# Patient Record
Sex: FEMALE | Race: OTHER | NOT HISPANIC OR LATINO | Employment: FULL TIME | URBAN - METROPOLITAN AREA
[De-identification: names, ages, dates, MRNs, and addresses within clinical notes are randomized per-mention and may not be internally consistent; named-entity substitution may affect disease eponyms.]

---

## 2022-07-06 RX ORDER — NITROFURANTOIN 25; 75 MG/1; MG/1
CAPSULE ORAL
COMMUNITY

## 2022-07-06 RX ORDER — CETIRIZINE HYDROCHLORIDE 10 MG/1
TABLET ORAL
COMMUNITY

## 2022-07-06 RX ORDER — IBUPROFEN 800 MG/1
TABLET ORAL
COMMUNITY

## 2022-07-06 RX ORDER — VALACYCLOVIR HYDROCHLORIDE 1 G/1
TABLET, FILM COATED ORAL
COMMUNITY

## 2022-07-06 RX ORDER — PHENAZOPYRIDINE HYDROCHLORIDE 200 MG/1
TABLET, FILM COATED ORAL
COMMUNITY

## 2022-07-06 RX ORDER — DEXTROAMPHETAMINE SACCHARATE, AMPHETAMINE ASPARTATE MONOHYDRATE, DEXTROAMPHETAMINE SULFATE AND AMPHETAMINE SULFATE 7.5; 7.5; 7.5; 7.5 MG/1; MG/1; MG/1; MG/1
CAPSULE, EXTENDED RELEASE ORAL
COMMUNITY

## 2024-05-23 ENCOUNTER — OFFICE VISIT (OUTPATIENT)
Dept: URGENT CARE | Facility: CLINIC | Age: 34
End: 2024-05-23

## 2024-05-23 VITALS
RESPIRATION RATE: 19 BRPM | BODY MASS INDEX: 22.76 KG/M2 | DIASTOLIC BLOOD PRESSURE: 100 MMHG | TEMPERATURE: 99 F | HEART RATE: 91 BPM | HEIGHT: 67 IN | WEIGHT: 145 LBS | SYSTOLIC BLOOD PRESSURE: 140 MMHG | OXYGEN SATURATION: 98 %

## 2024-05-23 DIAGNOSIS — L08.9 FINGER INFECTION: ICD-10-CM

## 2024-05-23 DIAGNOSIS — S69.92XA INJURY OF LEFT RING FINGER, INITIAL ENCOUNTER: Primary | ICD-10-CM

## 2024-05-23 DIAGNOSIS — R22.32 LOCALIZED SWELLING OF FINGER OF LEFT HAND: ICD-10-CM

## 2024-05-23 DIAGNOSIS — L03.012 CELLULITIS OF FINGER OF LEFT HAND: ICD-10-CM

## 2024-05-23 DIAGNOSIS — R22.32 LOCALIZED SWELLING ON LEFT HAND: ICD-10-CM

## 2024-05-23 PROCEDURE — 99203 OFFICE O/P NEW LOW 30 MIN: CPT | Mod: S$GLB,,, | Performed by: NURSE PRACTITIONER

## 2024-05-23 RX ORDER — MUPIROCIN 20 MG/G
OINTMENT TOPICAL 3 TIMES DAILY PRN
Qty: 22 G | Refills: 0 | Status: SHIPPED | OUTPATIENT
Start: 2024-05-23

## 2024-05-23 RX ORDER — CEPHALEXIN 500 MG/1
500 CAPSULE ORAL EVERY 6 HOURS
Qty: 28 CAPSULE | Refills: 0 | Status: SHIPPED | OUTPATIENT
Start: 2024-05-23 | End: 2024-05-30

## 2024-05-23 RX ORDER — DEXTROAMPHETAMINE SACCHARATE, AMPHETAMINE ASPARTATE, DEXTROAMPHETAMINE SULFATE AND AMPHETAMINE SULFATE 7.5; 7.5; 7.5; 7.5 MG/1; MG/1; MG/1; MG/1
1 TABLET ORAL
COMMUNITY
Start: 2024-05-13

## 2024-05-23 RX ORDER — CEPHALEXIN 500 MG/1
500 CAPSULE ORAL EVERY 6 HOURS
Qty: 28 CAPSULE | Refills: 0 | Status: SHIPPED | OUTPATIENT
Start: 2024-05-23 | End: 2024-05-23

## 2024-05-23 RX ORDER — DULOXETIN HYDROCHLORIDE 60 MG/1
60 CAPSULE, DELAYED RELEASE ORAL EVERY MORNING
COMMUNITY
Start: 2024-05-13

## 2024-05-23 RX ORDER — MUPIROCIN 20 MG/G
OINTMENT TOPICAL 3 TIMES DAILY PRN
Qty: 22 G | Refills: 0 | Status: SHIPPED | OUTPATIENT
Start: 2024-05-23 | End: 2024-05-23

## 2024-05-23 NOTE — PATIENT INSTRUCTIONS
Please drink plenty of fluids.  Please get plenty of rest.  Please return here or go to the Emergency Department for any concerns or worsening of condition.  You were prescribed antibiotics, please take them to completion.  Please return here in 2-3 days for a recheck of your wound. NO IMPROVEMENT  If not allergic, please take over the counter Tylenol (Acetaminophen) and/or Motrin (Ibuprofen) as directed for control of pain and/or fever.  Please follow up with your primary care doctor or specialist as needed.    If you  smoke, please stop smoking.

## 2024-05-23 NOTE — PROGRESS NOTES
"Subjective:      Patient ID: Halle Gray is a 33 y.o. female.    Vitals:  height is 5' 7" (1.702 m) and weight is 65.8 kg (145 lb). Her temperature is 98.7 °F (37.1 °C). Her blood pressure is 140/100 (abnormal) and her pulse is 91. Her respiration is 19 and oxygen saturation is 98%.     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 She reports cutting her finger at work on a bottle of alcohol 2 days ago treated injury with super glue, peroxide and topical antibiotic ointment from a first aid kit. Last night she noticed yellow oozing substance with swelling and redness to her ring finger and swelling and redness to left hand. Last tetanus vaccine reported as 4 years ago.     Hand Pain   The incident occurred 2 days ago. The incident occurred at work. The pain is present in the right hand. Pertinent negatives include no numbness. She has tried ice for the symptoms.       Constitution: Negative for chills and fever.   Musculoskeletal:  Positive for pain, trauma and muscle ache.   Skin:  Positive for color change and erythema.   Neurological:  Negative for numbness and tingling.      Objective:     Physical Exam   Constitutional: She is oriented to person, place, and time. She appears well-developed.   HENT:   Head: Normocephalic and atraumatic. Head is without abrasion, without contusion and without laceration.   Ears:   Right Ear: External ear normal.   Left Ear: External ear normal.   Nose: " Nose normal.   Mouth/Throat: Oropharynx is clear and moist and mucous membranes are normal.   Eyes: EOM and lids are normal. Extraocular movement intact   Neck: Trachea normal and phonation normal. Neck supple.   Cardiovascular: Normal rate.   Pulmonary/Chest: Effort normal. No stridor. No respiratory distress.   Musculoskeletal: Normal range of motion.         General: Normal range of motion.      Left hand: She exhibits tenderness and swelling. She exhibits normal range of motion and normal capillary refill. Left ring finger: Exhibits swelling and tenderness. Injuries: laceration.      Comments: Left ring finger with swelling, tenderness and scant amount of yellow drainage noted. Area cleaned with chlorhexidine, dressed with mupirocin and taped securely   Neurological: She is alert and oriented to person, place, and time.   Skin: Skin is warm, dry, intact and no rash. Capillary refill takes less than 2 seconds. erythema No abrasion, No burn, No bruising and No ecchymosis   Psychiatric: Her speech is normal and behavior is normal. Judgment and thought content normal.   Nursing note and vitals reviewed.            Assessment:     1. Injury of left ring finger, initial encounter    2. Finger infection    3. Localized swelling of finger of left hand    4. Localized swelling on left hand    5. Cellulitis of finger of left hand        Plan:       Injury of left ring finger, initial encounter    Finger infection  -     cephALEXin (KEFLEX) 500 MG capsule; Take 1 capsule (500 mg total) by mouth every 6 (six) hours. for 7 days  Dispense: 28 capsule; Refill: 0  -     mupirocin (BACTROBAN) 2 % ointment; Apply topically 3 (three) times daily as needed.  Dispense: 22 g; Refill: 0    Localized swelling of finger of left hand    Localized swelling on left hand    Cellulitis of finger of left hand  -     cephALEXin (KEFLEX) 500 MG capsule; Take 1 capsule (500 mg total) by mouth every 6 (six) hours. for 7 days  Dispense: 28  capsule; Refill: 0  -     mupirocin (BACTROBAN) 2 % ointment; Apply topically 3 (three) times daily as needed.  Dispense: 22 g; Refill: 0      Patient Instructions   Please drink plenty of fluids.  Please get plenty of rest.  Please return here or go to the Emergency Department for any concerns or worsening of condition.  You were prescribed antibiotics, please take them to completion.  Please return here in 2-3 days for a recheck of your wound. NO IMPROVEMENT  If not allergic, please take over the counter Tylenol (Acetaminophen) and/or Motrin (Ibuprofen) as directed for control of pain and/or fever.  Please follow up with your primary care doctor or specialist as needed.    If you  smoke, please stop smoking.